# Patient Record
(demographics unavailable — no encounter records)

---

## 2024-12-27 NOTE — DISCUSSION/SUMMARY
[FreeTextEntry1] : 41-year-old -0-2-0 presents with history of fibroid uterus, menorrhagia and pelvic pain.  Presently not sexually active.  Physical examination showed fibroid uterus.  Pap GC chlamydia sent.  Mammogram and pelvic sonogram ordered.  Patient to return in 4 to 6 weeks for follow-up.

## 2024-12-27 NOTE — HISTORY OF PRESENT ILLNESS
[N] : Patient does not use contraception [Y] : Positive pregnancy history [Regular Cycle Intervals] : periods have been regular [Frequency: Q ___ days] : menstrual periods occur approximately every [unfilled] days [Menarche Age: ____] : age at menarche was [unfilled] [FreeTextEntry1] : 41-year-old -0-2-0 last menstrual cycle was 2024 for 5 to 6 days occasionally heavy presents with history of fibroid uterus and lower abdominal discomfort.  She is presently not sexually active because she has no partner at the moment.  No discharge or itching. [PGHxTotal] : 2 [PGHxFullTerm] : 0 [PGHxPremature] : 0 [PGHxAbortions] : 2 [[facility] [PGHxABInduced] : 2 [PGHxABSpont] : 0 [PGHxEctopic] : 0 [PGHxMultBirths] : 0

## 2024-12-27 NOTE — PHYSICAL EXAM
[Chaperone Present] : A chaperone was present in the examining room during all aspects of the physical examination [74021] : A chaperone was present during the pelvic exam. [FreeTextEntry2] : Rosalba [Appropriately responsive] : appropriately responsive [Alert] : alert [No Acute Distress] : no acute distress [No Lymphadenopathy] : no lymphadenopathy [Regular Rate Rhythm] : regular rate rhythm [No Murmurs] : no murmurs [Clear to Auscultation B/L] : clear to auscultation bilaterally [Soft] : soft [Non-tender] : non-tender [Non-distended] : non-distended [No HSM] : No HSM [No Lesions] : no lesions [No Mass] : no mass [Oriented x3] : oriented x3 [Examination Of The Breasts] : a normal appearance [No Masses] : no breast masses were palpable [Labia Majora] : normal [Labia Minora] : normal [Normal] : normal [Enlarged ___ wks] : enlarged [unfilled] ~Uweeks [Uterine Adnexae] : normal [Declined] : Patient declined rectal exam

## 2025-03-07 NOTE — HISTORY OF PRESENT ILLNESS
[FreeTextEntry1] : 41-year-old -0-1-0 last menstrual cycle started 2025 for 6 to 7 days.  History of menorrhagia and fibroid uterus.  She is presently not sexually active.

## 2025-03-07 NOTE — DISCUSSION/SUMMARY
[FreeTextEntry1] : 41-year-old -0-1-0 with menorrhagia and fibroid uterus.  She is presently not sexually active.  Physical examination fibroid uterus and pelvic sonogram is pending.  Mammogram is normal.  Patient to return in 6 weeks for hysteroscopy endometrial sampling to evaluate the uterus and perhaps the fibroids.

## 2025-04-18 NOTE — PROCEDURE
[Hysteroscopy] : Hysteroscopy [Time out performed] : Pre-procedure time out performed.  Patient's name, date of birth and procedure confirmed. [Consent Obtained] : Consent obtained [Abnormal uterine bleeding] : abnormal uterine bleeding [Risks] : risks [Benefits] : benefits [Alternatives] : alternatives [Patient] : patient [Infection] : infection [Bleeding] : bleeding [Allergic Reaction] : allergic reaction [Lidocaine___ mL] : [unfilled] ~UmL of lidocaine [flexible] : Using aseptic technique a hysteroscopy was performed using a flexible hysteroscope [Sent to Pathology] : specimen was placed in buffered formalin and sent for pathology [Hemostasis obtained] : hemostasis obtained [Tolerated Well] : Patient tolerated the procedure well [Aftercare instructions/regstrictions given and follow-up scheduled] : Aftercare instructions/restrictions given and follow-up scheduled [Antibiotics given] : antibiotics not given [de-identified] : Under sterile condition and with paracervical block the cervix was dilated and endometrial sampling obtained and sent to pathology.  Hysteroscopic evaluation shows lush endometrium with irregular cavity.  No polyps noted.  Patient tolerated the procedure well.

## 2025-05-16 NOTE — DISCUSSION/SUMMARY
[FreeTextEntry1] : 41-year-old -0-2-0 presents with menorrhagia and occasional pelvic pain and sonogram showed fibroid uterus.  Endometrial biopsy shows secretory endometrium.  Treatment options for the fibroid uterus discussed and patient prefers to observe.  Return in 6 months for follow-up.  All questions answered.

## 2025-05-16 NOTE — HISTORY OF PRESENT ILLNESS
[FreeTextEntry1] : 41-year-old -0-2-0 status post endometrial sampling for menorrhagia and pathology shows secretory endometrium.  Patient denies severe menorrhagia or pain and is presently not sexually active.  Treatment options discussed and patient prefers to observe.